# Patient Record
Sex: FEMALE | Race: WHITE | Employment: FULL TIME | URBAN - METROPOLITAN AREA
[De-identification: names, ages, dates, MRNs, and addresses within clinical notes are randomized per-mention and may not be internally consistent; named-entity substitution may affect disease eponyms.]

---

## 2017-02-08 ENCOUNTER — TELEPHONE (OUTPATIENT)
Dept: FAMILY MEDICINE CLINIC | Facility: CLINIC | Age: 37
End: 2017-02-08

## 2017-02-08 DIAGNOSIS — Z00.00 HEALTHCARE MAINTENANCE: Primary | ICD-10-CM

## 2017-02-08 RX ORDER — DOXYCYCLINE HYCLATE 100 MG
TABLET ORAL
COMMUNITY
Start: 2016-01-19 | End: 2017-03-03

## 2017-02-08 RX ORDER — CLINDAMYCIN PHOSPHATE 11.9 MG/ML
SOLUTION TOPICAL
COMMUNITY
Start: 2014-11-05 | End: 2018-10-22 | Stop reason: ALTCHOICE

## 2017-02-08 NOTE — TELEPHONE ENCOUNTER
Pt had labs done last back November 2014. Pt will come to office for lab draw.       Future Appointments  Date Time Provider Juanita Bronwyn   2/25/2017 9:30 AM REF [place] [facility]   3/3/2017 1:30 PM Sissy Martinez MD [facility]

## 2017-02-25 ENCOUNTER — LAB ENCOUNTER (OUTPATIENT)
Dept: LAB | Age: 37
End: 2017-02-25
Attending: FAMILY MEDICINE
Payer: COMMERCIAL

## 2017-02-25 DIAGNOSIS — Z00.00 HEALTHCARE MAINTENANCE: ICD-10-CM

## 2017-02-25 LAB
ALBUMIN SERPL-MCNC: 4 G/DL (ref 3.5–4.8)
ALP LIVER SERPL-CCNC: 53 U/L (ref 37–98)
ALT SERPL-CCNC: 23 U/L (ref 14–54)
AST SERPL-CCNC: 11 U/L (ref 15–41)
BASOPHILS # BLD AUTO: 0.05 X10(3) UL (ref 0–0.1)
BASOPHILS NFR BLD AUTO: 0.8 %
BILIRUB SERPL-MCNC: 0.5 MG/DL (ref 0.1–2)
BILIRUB UR QL STRIP.AUTO: NEGATIVE
BUN BLD-MCNC: 10 MG/DL (ref 8–20)
CALCIUM BLD-MCNC: 9 MG/DL (ref 8.3–10.3)
CHLORIDE: 105 MMOL/L (ref 101–111)
CHOLEST SMN-MCNC: 160 MG/DL (ref ?–200)
CLARITY UR REFRACT.AUTO: CLEAR
CO2: 26 MMOL/L (ref 22–32)
CREAT BLD-MCNC: 0.76 MG/DL (ref 0.55–1.02)
EOSINOPHIL # BLD AUTO: 0.15 X10(3) UL (ref 0–0.3)
EOSINOPHIL NFR BLD AUTO: 2.3 %
ERYTHROCYTE [DISTWIDTH] IN BLOOD BY AUTOMATED COUNT: 12.2 % (ref 11.5–16)
GLUCOSE BLD-MCNC: 76 MG/DL (ref 70–99)
GLUCOSE UR STRIP.AUTO-MCNC: NEGATIVE MG/DL
HCT VFR BLD AUTO: 43 % (ref 34–50)
HDLC SERPL-MCNC: 61 MG/DL (ref 45–?)
HDLC SERPL: 2.62 {RATIO} (ref ?–4.44)
HGB BLD-MCNC: 14.2 G/DL (ref 12–16)
IMMATURE GRANULOCYTE COUNT: 0.01 X10(3) UL (ref 0–1)
IMMATURE GRANULOCYTE RATIO %: 0.2 %
KETONES UR STRIP.AUTO-MCNC: NEGATIVE MG/DL
LDLC SERPL CALC-MCNC: 86 MG/DL (ref ?–130)
LEUKOCYTE ESTERASE UR QL STRIP.AUTO: NEGATIVE
LYMPHOCYTES # BLD AUTO: 2.56 X10(3) UL (ref 0.9–4)
LYMPHOCYTES NFR BLD AUTO: 38.5 %
M PROTEIN MFR SERPL ELPH: 7.6 G/DL (ref 6.1–8.3)
MCH RBC QN AUTO: 30.6 PG (ref 27–33.2)
MCHC RBC AUTO-ENTMCNC: 33 G/DL (ref 31–37)
MCV RBC AUTO: 92.7 FL (ref 81–100)
MONOCYTES # BLD AUTO: 0.96 X10(3) UL (ref 0.1–0.6)
MONOCYTES NFR BLD AUTO: 14.4 %
NEUTROPHIL ABS PRELIM: 2.92 X10 (3) UL (ref 1.3–6.7)
NEUTROPHILS # BLD AUTO: 2.92 X10(3) UL (ref 1.3–6.7)
NEUTROPHILS NFR BLD AUTO: 43.8 %
NITRITE UR QL STRIP.AUTO: NEGATIVE
NONHDLC SERPL-MCNC: 99 MG/DL (ref ?–130)
PH UR STRIP.AUTO: 7 [PH] (ref 4.5–8)
PLATELET # BLD AUTO: 261 10(3)UL (ref 150–450)
POTASSIUM SERPL-SCNC: 4.2 MMOL/L (ref 3.6–5.1)
PROT UR STRIP.AUTO-MCNC: NEGATIVE MG/DL
RBC # BLD AUTO: 4.64 X10(6)UL (ref 3.8–5.1)
RBC UR QL AUTO: NEGATIVE
RED CELL DISTRIBUTION WIDTH-SD: 41.5 FL (ref 35.1–46.3)
SODIUM SERPL-SCNC: 137 MMOL/L (ref 136–144)
SP GR UR STRIP.AUTO: 1.01 (ref 1–1.03)
TRIGLYCERIDES: 63 MG/DL (ref ?–150)
TSI SER-ACNC: 1.32 MIU/ML (ref 0.35–5.5)
UROBILINOGEN UR STRIP.AUTO-MCNC: <2 MG/DL
VLDL: 13 MG/DL (ref 5–40)
WBC # BLD AUTO: 6.7 X10(3) UL (ref 4–13)

## 2017-02-25 PROCEDURE — 80061 LIPID PANEL: CPT

## 2017-02-25 PROCEDURE — 85025 COMPLETE CBC W/AUTO DIFF WBC: CPT

## 2017-02-25 PROCEDURE — 81003 URINALYSIS AUTO W/O SCOPE: CPT

## 2017-02-25 PROCEDURE — 80053 COMPREHEN METABOLIC PANEL: CPT

## 2017-02-25 PROCEDURE — 84443 ASSAY THYROID STIM HORMONE: CPT

## 2017-02-27 ENCOUNTER — TELEPHONE (OUTPATIENT)
Dept: FAMILY MEDICINE CLINIC | Facility: CLINIC | Age: 37
End: 2017-02-27

## 2017-02-27 NOTE — TELEPHONE ENCOUNTER
----- Message from Jayesh Orellana MD sent at 2/27/2017 11:35 AM CST -----  Reviewed  Normal labs  Will d/w pt at upcoming appt  3/3/2017   1:30 PM    Ritu Prasad MD     [facility] [place]   [facility] [place]

## 2017-02-27 NOTE — TELEPHONE ENCOUNTER
Future Appointments  Date Time Provider Juanita Bronwyn   3/3/2017 1:30 PM Sunil Farfan MD [facility] [facility] [place]     Pt informed.

## 2017-03-03 ENCOUNTER — OFFICE VISIT (OUTPATIENT)
Dept: FAMILY MEDICINE CLINIC | Facility: CLINIC | Age: 37
End: 2017-03-03

## 2017-03-03 VITALS
HEIGHT: 63.75 IN | HEART RATE: 88 BPM | DIASTOLIC BLOOD PRESSURE: 90 MMHG | BODY MASS INDEX: 22.44 KG/M2 | SYSTOLIC BLOOD PRESSURE: 132 MMHG | TEMPERATURE: 99 F | WEIGHT: 129.81 LBS | RESPIRATION RATE: 20 BRPM

## 2017-03-03 DIAGNOSIS — Z00.00 ROUTINE GENERAL MEDICAL EXAMINATION AT A HEALTH CARE FACILITY: Primary | ICD-10-CM

## 2017-03-03 PROCEDURE — 99395 PREV VISIT EST AGE 18-39: CPT | Performed by: FAMILY MEDICINE

## 2017-03-03 RX ORDER — ONDANSETRON 4 MG/1
4 TABLET, ORALLY DISINTEGRATING ORAL AS NEEDED
Refills: 0 | COMMUNITY
Start: 2017-01-21 | End: 2019-08-15 | Stop reason: ALTCHOICE

## 2017-03-03 RX ORDER — DOXYCYCLINE 40 MG/1
40 CAPSULE ORAL EVERY MORNING
COMMUNITY
End: 2018-10-22 | Stop reason: ALTCHOICE

## 2017-03-03 NOTE — PROGRESS NOTES
CC: Annual Physical Exam    HPI:   Jameson Gowers is a 29year old female who presents for a complete physical exam.  Patient generally feeling well.labs reviewed, normal and reassuring.   .     Wt Readings from Last 6 Encounters:  03/03/17 : 129 lb 13 oz 12.2 11.5-16.0 %   RDW-SD 41.5 35.1-46.3 fL   Neutrophil Absolute Prelim 2.92 1.30-6.70 x10 (3) uL   Neutrophil Absolute 2.92 1.30-6.70 x10(3) uL   Lymphocyte Absolute 2.56 0.90-4.00 x10(3) uL   Monocyte Absolute 0.96 (H) 0.10-0.60 x10(3) uL   Eosinophil A Exercise: running, walking. Diet: tries to follow healthy diet     REVIEW OF SYSTEMS:   CONSTITUTIONAL:  Denies unusual weight gain/loss, fever, chills, or fatigue.   EENT:  Eyes:  Denies eye pain, visual loss, blurred vision, double vision intact bilaterally, no excess cerumen or erythema. Nose: patent, no nasal discharge Throat:  No tonsillar erythema or exudate. Mouth:  No oral lesions or ulcerations, good dentition. NECK: Supple, no JVD, no carotid bruit, no thyromegaly.   SKIN: No rashe patient is asked to return in 1 yr.

## 2017-03-31 ENCOUNTER — OFFICE VISIT (OUTPATIENT)
Dept: FAMILY MEDICINE CLINIC | Facility: CLINIC | Age: 37
End: 2017-03-31

## 2017-03-31 VITALS
SYSTOLIC BLOOD PRESSURE: 114 MMHG | BODY MASS INDEX: 24 KG/M2 | HEART RATE: 100 BPM | WEIGHT: 136.31 LBS | TEMPERATURE: 99 F | RESPIRATION RATE: 20 BRPM | DIASTOLIC BLOOD PRESSURE: 76 MMHG

## 2017-03-31 DIAGNOSIS — R21 RASH AND NONSPECIFIC SKIN ERUPTION: Primary | ICD-10-CM

## 2017-03-31 PROCEDURE — 99212 OFFICE O/P EST SF 10 MIN: CPT | Performed by: FAMILY MEDICINE

## 2017-03-31 RX ORDER — VALACYCLOVIR HYDROCHLORIDE 1 G/1
1 TABLET, FILM COATED ORAL 3 TIMES DAILY
COMMUNITY
End: 2018-10-22 | Stop reason: ALTCHOICE

## 2017-03-31 NOTE — PATIENT INSTRUCTIONS
Likely shingles rash starting up. Finish the course of valtrex. Return to clinic if pain or rash worse. Will consider evaluation for low immune system if another episode.

## 2017-04-01 NOTE — PROGRESS NOTES
[facility] [place]  PROGRESS NOTE  Chief Complaint:   Patient presents with:  Rash: possible singles on side       HPI:   This is a 29year old female presents to discuss recent rash.   But 2 days ago patient started having one isolated skin lesi 7.0 4.5-8.0   Protein Urine Negative Negative mg/dl   Urobilinogen Urine <2.0 0.2-2.0 mg/dL   Nitrite Urine Negative Negative   Leukocyte Esterase Urine Negative Negative   Microscopic Microscopic not indicated    -CBC W/ DIFFERENTIAL   Result Value Ref Ra Release Take 40 mg by mouth every morning. Disp:  Rfl:    Levonorgestrel (MIRENA, 52 MG,) 20 MCG/24HR Intrauterine IUD  Disp:  Rfl:    ondansetron 4 MG Oral Tablet Dispersible 4 mg as needed.  Disp:  Rfl: 0   Clindamycin Phosphate (CLEOCIN-T) 1 % External S 09/01/2016    Patient/Caregiver Education: Patient/Caregiver Education: There are no barriers to learning. Medical education done. Outcome: Patient verbalizes understanding.  Patient is notified to call with any questions, complications, allergies, or wor

## 2018-08-27 ENCOUNTER — TELEPHONE (OUTPATIENT)
Dept: FAMILY MEDICINE CLINIC | Facility: CLINIC | Age: 38
End: 2018-08-27

## 2018-08-27 NOTE — TELEPHONE ENCOUNTER
Pt dropped off form for CR review - marked urgent. TB symptom eval form for [address] [facility] [address]. Pt last seen 3/2017. Pt informed that CR is not able to complete without appt. Pt verbalized understanding.   Pt stated that she would

## 2018-10-22 ENCOUNTER — OFFICE VISIT (OUTPATIENT)
Dept: FAMILY MEDICINE CLINIC | Facility: CLINIC | Age: 38
End: 2018-10-22
Payer: COMMERCIAL

## 2018-10-22 ENCOUNTER — APPOINTMENT (OUTPATIENT)
Dept: LAB | Age: 38
End: 2018-10-22
Attending: FAMILY MEDICINE
Payer: COMMERCIAL

## 2018-10-22 VITALS
WEIGHT: 137.38 LBS | TEMPERATURE: 98 F | OXYGEN SATURATION: 98 % | HEART RATE: 102 BPM | RESPIRATION RATE: 16 BRPM | SYSTOLIC BLOOD PRESSURE: 140 MMHG | BODY MASS INDEX: 23.74 KG/M2 | DIASTOLIC BLOOD PRESSURE: 82 MMHG | HEIGHT: 63.75 IN

## 2018-10-22 DIAGNOSIS — I34.1 MVP (MITRAL VALVE PROLAPSE): ICD-10-CM

## 2018-10-22 DIAGNOSIS — R03.0 WHITE COAT SYNDROME WITHOUT DIAGNOSIS OF HYPERTENSION: ICD-10-CM

## 2018-10-22 DIAGNOSIS — Z00.00 ROUTINE GENERAL MEDICAL EXAMINATION AT A HEALTH CARE FACILITY: Primary | ICD-10-CM

## 2018-10-22 PROCEDURE — 81003 URINALYSIS AUTO W/O SCOPE: CPT | Performed by: FAMILY MEDICINE

## 2018-10-22 PROCEDURE — 36415 COLL VENOUS BLD VENIPUNCTURE: CPT | Performed by: FAMILY MEDICINE

## 2018-10-22 PROCEDURE — 80050 GENERAL HEALTH PANEL: CPT | Performed by: FAMILY MEDICINE

## 2018-10-22 PROCEDURE — 99395 PREV VISIT EST AGE 18-39: CPT | Performed by: FAMILY MEDICINE

## 2018-10-22 PROCEDURE — 80061 LIPID PANEL: CPT | Performed by: FAMILY MEDICINE

## 2018-10-22 NOTE — PROGRESS NOTES
[facility] [place]  PROGRESS NOTE  Chief Complaint:   Patient presents with:  Physical      HPI:   This is a 40year old female coming in for annual health check'  Pt sees Arnaldo Hagan 75 earlier today  Monitor Home BP  - 120/80's at home, 106/70 Urobilinogen Urine <2.0 0.2 - 2.0 mg/dL    Nitrite Urine Negative Negative    Leukocyte Esterase Urine Negative Negative    Microscopic Microscopic not indicated    CBC W/ DIFFERENTIAL   Result Value Ref Range    WBC 6.7 4.0 - 13.0 x10(3) uL    RBC 4.64 3. [facility]    Tobacco Use      Smoking status: Never Smoker      Smokeless tobacco: Never Used    Substance and Sexual Activity      Alcohol use:  Yes        Alcohol/week: 0.0 oz        Comment: 2 per month- wine,  mixed      Drug use: No      Sexual Jose Juan Gunner Denies excessive sweating, cold or heat intolerance, polyuria or polydipsia. ALLERGIES:  Denies allergic response, history of asthma, sneezing, hives, eczema or rhinitis.      EXAM:   /82 (BP Location: Left arm, Patient Position: Sitting, Cuff Size: adjustments. - CBC WITH DIFFERENTIAL WITH PLATELET; Future  - COMP METABOLIC PANEL (14); Future  - LIPID PANEL;  Future  - TSH W REFLEX TO FREE T4; Future  - URINALYSIS WITH CULTURE REFLEX; Future  - URINALYSIS WITH CULTURE REFLEX  - CBC WITH DIFFERENTIAL

## 2018-10-22 NOTE — PATIENT INSTRUCTIONS
Fasting labs today    Pt to monitor diet, encourage exercise    Recheck as needed    Encourage monitor BP and pulse intermittently-  D/w pt if elevated more often, consider toprol 25 mg a day.

## 2018-10-23 ENCOUNTER — PATIENT MESSAGE (OUTPATIENT)
Dept: FAMILY MEDICINE CLINIC | Facility: CLINIC | Age: 38
End: 2018-10-23

## 2018-10-23 NOTE — TELEPHONE ENCOUNTER
From: Taylor Cho  To: Eleanor Feldman MD  Sent: 10/23/2018 12:52 PM CDT  Subject: Visit Follow-up Question    Hi there. BP today is 116/74. HR 78.  I bought a used treadmill off [employer] and ate quinoa for dinner last night :-) Thanks for the edelmira

## 2019-03-25 ENCOUNTER — OFFICE VISIT (OUTPATIENT)
Dept: FAMILY MEDICINE CLINIC | Facility: CLINIC | Age: 39
End: 2019-03-25
Payer: COMMERCIAL

## 2019-03-25 VITALS
DIASTOLIC BLOOD PRESSURE: 90 MMHG | OXYGEN SATURATION: 100 % | WEIGHT: 134 LBS | HEIGHT: 63.75 IN | HEART RATE: 100 BPM | SYSTOLIC BLOOD PRESSURE: 120 MMHG | TEMPERATURE: 97 F | BODY MASS INDEX: 23.16 KG/M2

## 2019-03-25 DIAGNOSIS — J18.9 PNEUMONIA OF RIGHT LOWER LOBE DUE TO INFECTIOUS ORGANISM: Primary | ICD-10-CM

## 2019-03-25 PROCEDURE — 99213 OFFICE O/P EST LOW 20 MIN: CPT | Performed by: NURSE PRACTITIONER

## 2019-03-25 RX ORDER — AZITHROMYCIN 250 MG/1
250 TABLET, FILM COATED ORAL DAILY
COMMUNITY
End: 2019-08-15 | Stop reason: ALTCHOICE

## 2019-03-25 NOTE — PROGRESS NOTES
CHIEF COMPLAINT:   Patient presents with:  Er F/u: pneumonia      HPI:   Igor Juarez is a 41year old female who presents to clinic today with complaints of pneumonia-   Had negative viral nasal swab.    Symptoms hit her fast- fever, body aches- was in b congestion  LYMPH: no lymphadenopathy. THROAT: oral mucosa pink, moist. Posterior pharynx not erythematous or injected. No exudates.   NECK: supple, non-tender  THYROID: Normal size, no nodules  LUNGS: Lungs are clear to auscultation bilaterally, no whee

## 2019-03-29 ENCOUNTER — TELEPHONE (OUTPATIENT)
Dept: FAMILY MEDICINE CLINIC | Facility: CLINIC | Age: 39
End: 2019-03-29

## 2019-03-29 RX ORDER — AZITHROMYCIN 250 MG/1
TABLET, FILM COATED ORAL
Qty: 6 TABLET | Refills: 0 | Status: SHIPPED | OUTPATIENT
Start: 2019-03-29 | End: 2019-08-15 | Stop reason: ALTCHOICE

## 2019-03-29 NOTE — TELEPHONE ENCOUNTER
Pt prefers to continue with antx for another 5 days at this point. Pt states if s/s worsen or if not better after additional 5 days, then pt will call to schedule follow up appt.   Uses Corin Lucas

## 2019-03-29 NOTE — TELEPHONE ENCOUNTER
Pt states she went to ER on 3/22, pt was dx:  with right lower lobe pneumonia, pt states she was very ill. Pt states she was given Rocephin and IV fluids, pt was sent home on regular strength Z-pack. Pt was seen in office per EL on 3/25.  Pt states she w

## 2019-09-26 ENCOUNTER — TELEPHONE (OUTPATIENT)
Dept: FAMILY MEDICINE CLINIC | Facility: CLINIC | Age: 39
End: 2019-09-26

## 2019-09-26 DIAGNOSIS — Z11.1 SCREENING FOR TUBERCULOSIS: Primary | ICD-10-CM

## 2019-09-26 NOTE — TELEPHONE ENCOUNTER
Future Appointments   Date Time Provider Juanita Barnhart   9/27/2019 11:15 AM REF [place] [facility]

## 2019-09-26 NOTE — TELEPHONE ENCOUNTER
needs TB test  / quantaferine BW  for her job     can you put an order in [facility] ?   ( is aware Dr Giancarlo Lanza out until next week )    please advise

## 2019-09-27 ENCOUNTER — APPOINTMENT (OUTPATIENT)
Dept: LAB | Age: 39
End: 2019-09-27
Attending: FAMILY MEDICINE
Payer: COMMERCIAL

## 2019-09-27 DIAGNOSIS — Z11.1 SCREENING FOR TUBERCULOSIS: ICD-10-CM

## 2019-09-27 PROCEDURE — 86480 TB TEST CELL IMMUN MEASURE: CPT | Performed by: NURSE PRACTITIONER

## 2019-09-27 PROCEDURE — 36415 COLL VENOUS BLD VENIPUNCTURE: CPT | Performed by: NURSE PRACTITIONER

## 2019-10-02 ENCOUNTER — TELEPHONE (OUTPATIENT)
Dept: FAMILY MEDICINE CLINIC | Facility: CLINIC | Age: 39
End: 2019-10-02

## 2019-10-02 NOTE — TELEPHONE ENCOUNTER
Informed pt of her blood test results. Copy placed up front for . Pt expressed understanding and thanks.

## 2019-10-02 NOTE — TELEPHONE ENCOUNTER
----- Message from LIZANDRO Nelson sent at 10/2/2019  1:35 PM CDT -----  Please notify patient that her QuantiFERON gold is negative–please give patient a copy of the lab if she is unable to print from my chart.

## 2020-07-28 ENCOUNTER — OFFICE VISIT (OUTPATIENT)
Dept: FAMILY MEDICINE CLINIC | Facility: CLINIC | Age: 40
End: 2020-07-28
Payer: COMMERCIAL

## 2020-07-28 VITALS
HEART RATE: 104 BPM | OXYGEN SATURATION: 98 % | RESPIRATION RATE: 16 BRPM | HEIGHT: 63.75 IN | BODY MASS INDEX: 24.02 KG/M2 | TEMPERATURE: 98 F | DIASTOLIC BLOOD PRESSURE: 62 MMHG | WEIGHT: 139 LBS | SYSTOLIC BLOOD PRESSURE: 110 MMHG

## 2020-07-28 DIAGNOSIS — M79.10 MYALGIA: ICD-10-CM

## 2020-07-28 DIAGNOSIS — R21 RASH: ICD-10-CM

## 2020-07-28 DIAGNOSIS — B02.9 HERPES ZOSTER WITHOUT COMPLICATION: ICD-10-CM

## 2020-07-28 DIAGNOSIS — Z00.00 ROUTINE GENERAL MEDICAL EXAMINATION AT A HEALTH CARE FACILITY: Primary | ICD-10-CM

## 2020-07-28 PROBLEM — J18.9 PNEUMONIA: Status: ACTIVE | Noted: 2019-03-22

## 2020-07-28 PROBLEM — J18.9 PNEUMONIA: Status: RESOLVED | Noted: 2019-03-22 | Resolved: 2020-07-28

## 2020-07-28 PROCEDURE — 3074F SYST BP LT 130 MM HG: CPT | Performed by: FAMILY MEDICINE

## 2020-07-28 PROCEDURE — 3008F BODY MASS INDEX DOCD: CPT | Performed by: FAMILY MEDICINE

## 2020-07-28 PROCEDURE — 3078F DIAST BP <80 MM HG: CPT | Performed by: FAMILY MEDICINE

## 2020-07-28 PROCEDURE — 99395 PREV VISIT EST AGE 18-39: CPT | Performed by: FAMILY MEDICINE

## 2020-07-28 NOTE — PATIENT INSTRUCTIONS
Labs ordered  Monitor rash and muscle aches    Continue exercise      Further recommendations pending lab results

## 2020-07-28 NOTE — PROGRESS NOTES
[address]  PROGRESS NOTE  Chief Complaint:   Patient presents with: Well Adult      HPI:   This is a 46year old female coming in for general check  Due for labs    occ derm issues.  -- skin biopsy- inflammatory skin lesions- nor respon drinks        Comment: 2 per month- wine,  mixed      Drug use: No    Other Topics      Concerns:    Family History:  Family History   Problem Relation Age of Onset   • Hypertension Mother    • Cancer Mother         CLL   • Other (elevated chol) Mother Estimated body mass index is 24.05 kg/m² as calculated from the following:    Height as of this encounter: 63.75\". Weight as of this encounter: 139 lb (63 kg). Vital signs reviewed. Appears stated age, well groomed.   Physical Exam:  GEN:  Patient is a Future    4. Rash  Monitor for recurrent rashes rule out herpes.   - HERPES SIMPLEX VIRUS I/II, IGG; Future      Problem List:  Patient Active Problem List:     Routine general medical examination at a health care facility     MVP (mitral valve prolapse)

## 2020-08-10 ENCOUNTER — LABORATORY ENCOUNTER (OUTPATIENT)
Dept: LAB | Age: 40
End: 2020-08-10
Attending: FAMILY MEDICINE
Payer: COMMERCIAL

## 2020-08-10 DIAGNOSIS — B02.9 HERPES ZOSTER WITHOUT COMPLICATION: ICD-10-CM

## 2020-08-10 DIAGNOSIS — M79.10 MYALGIA: ICD-10-CM

## 2020-08-10 DIAGNOSIS — Z00.00 ROUTINE GENERAL MEDICAL EXAMINATION AT A HEALTH CARE FACILITY: ICD-10-CM

## 2020-08-10 DIAGNOSIS — R21 RASH: Primary | ICD-10-CM

## 2020-08-10 LAB
ALBUMIN SERPL-MCNC: 4.1 G/DL (ref 3.4–5)
ALBUMIN/GLOB SERPL: 1.1 {RATIO} (ref 1–2)
ALP LIVER SERPL-CCNC: 51 U/L (ref 37–98)
ALT SERPL-CCNC: 16 U/L (ref 13–56)
ANION GAP SERPL CALC-SCNC: 2 MMOL/L (ref 0–18)
AST SERPL-CCNC: 14 U/L (ref 15–37)
BASOPHILS # BLD AUTO: 0.07 X10(3) UL (ref 0–0.2)
BASOPHILS NFR BLD AUTO: 0.9 %
BILIRUB SERPL-MCNC: 0.6 MG/DL (ref 0.1–2)
BILIRUB UR QL STRIP.AUTO: NEGATIVE
BUN BLD-MCNC: 13 MG/DL (ref 7–18)
BUN/CREAT SERPL: 16.3 (ref 10–20)
CALCIUM BLD-MCNC: 9 MG/DL (ref 8.5–10.1)
CHLORIDE SERPL-SCNC: 108 MMOL/L (ref 98–112)
CHOLEST SMN-MCNC: 181 MG/DL (ref ?–200)
CLARITY UR REFRACT.AUTO: CLEAR
CO2 SERPL-SCNC: 28 MMOL/L (ref 21–32)
COLOR UR AUTO: YELLOW
CREAT BLD-MCNC: 0.8 MG/DL (ref 0.55–1.02)
CRP SERPL-MCNC: <0.29 MG/DL (ref ?–0.3)
DEPRECATED RDW RBC AUTO: 43.1 FL (ref 35.1–46.3)
EOSINOPHIL # BLD AUTO: 0.11 X10(3) UL (ref 0–0.7)
EOSINOPHIL NFR BLD AUTO: 1.5 %
ERYTHROCYTE [DISTWIDTH] IN BLOOD BY AUTOMATED COUNT: 12.4 % (ref 11–15)
GLOBULIN PLAS-MCNC: 3.6 G/DL (ref 2.8–4.4)
GLUCOSE BLD-MCNC: 88 MG/DL (ref 70–99)
GLUCOSE UR STRIP.AUTO-MCNC: NEGATIVE MG/DL
HCT VFR BLD AUTO: 45 % (ref 35–48)
HDLC SERPL-MCNC: 55 MG/DL (ref 40–59)
HGB BLD-MCNC: 14.3 G/DL (ref 12–16)
IMM GRANULOCYTES # BLD AUTO: 0.02 X10(3) UL (ref 0–1)
IMM GRANULOCYTES NFR BLD: 0.3 %
KETONES UR STRIP.AUTO-MCNC: NEGATIVE MG/DL
LDLC SERPL CALC-MCNC: 112 MG/DL (ref ?–100)
LEUKOCYTE ESTERASE UR QL STRIP.AUTO: NEGATIVE
LYMPHOCYTES # BLD AUTO: 2.75 X10(3) UL (ref 1–4)
LYMPHOCYTES NFR BLD AUTO: 36.6 %
M PROTEIN MFR SERPL ELPH: 7.7 G/DL (ref 6.4–8.2)
MCH RBC QN AUTO: 30.3 PG (ref 26–34)
MCHC RBC AUTO-ENTMCNC: 31.8 G/DL (ref 31–37)
MCV RBC AUTO: 95.3 FL (ref 80–100)
MONOCYTES # BLD AUTO: 0.61 X10(3) UL (ref 0.1–1)
MONOCYTES NFR BLD AUTO: 8.1 %
NEUTROPHILS # BLD AUTO: 3.95 X10 (3) UL (ref 1.5–7.7)
NEUTROPHILS # BLD AUTO: 3.95 X10(3) UL (ref 1.5–7.7)
NEUTROPHILS NFR BLD AUTO: 52.6 %
NITRITE UR QL STRIP.AUTO: NEGATIVE
NONHDLC SERPL-MCNC: 126 MG/DL (ref ?–130)
OSMOLALITY SERPL CALC.SUM OF ELEC: 286 MOSM/KG (ref 275–295)
PATIENT FASTING Y/N/NP: YES
PATIENT FASTING Y/N/NP: YES
PH UR STRIP.AUTO: 6 [PH] (ref 4.5–8)
PLATELET # BLD AUTO: 282 10(3)UL (ref 150–450)
POTASSIUM SERPL-SCNC: 4.2 MMOL/L (ref 3.5–5.1)
PROT UR STRIP.AUTO-MCNC: NEGATIVE MG/DL
RBC # BLD AUTO: 4.72 X10(6)UL (ref 3.8–5.3)
RBC UR QL AUTO: NEGATIVE
RHEUMATOID FACT SERPL-ACNC: <10 IU/ML (ref ?–15)
SED RATE-ML: 6 MM/HR (ref 0–25)
SODIUM SERPL-SCNC: 138 MMOL/L (ref 136–145)
SP GR UR STRIP.AUTO: 1.02 (ref 1–1.03)
TRIGL SERPL-MCNC: 71 MG/DL (ref 30–149)
TSI SER-ACNC: 1.31 MIU/ML (ref 0.36–3.74)
UROBILINOGEN UR STRIP.AUTO-MCNC: <2 MG/DL
VLDLC SERPL CALC-MCNC: 14 MG/DL (ref 0–30)
WBC # BLD AUTO: 7.5 X10(3) UL (ref 4–11)

## 2020-08-10 PROCEDURE — 85652 RBC SED RATE AUTOMATED: CPT | Performed by: FAMILY MEDICINE

## 2020-08-10 PROCEDURE — 86038 ANTINUCLEAR ANTIBODIES: CPT | Performed by: FAMILY MEDICINE

## 2020-08-10 PROCEDURE — 36415 COLL VENOUS BLD VENIPUNCTURE: CPT | Performed by: FAMILY MEDICINE

## 2020-08-10 PROCEDURE — 86695 HERPES SIMPLEX TYPE 1 TEST: CPT | Performed by: FAMILY MEDICINE

## 2020-08-10 PROCEDURE — 87476 LYME DIS DNA AMP PROBE: CPT | Performed by: FAMILY MEDICINE

## 2020-08-10 PROCEDURE — 86696 HERPES SIMPLEX TYPE 2 TEST: CPT | Performed by: FAMILY MEDICINE

## 2020-08-10 PROCEDURE — 80050 GENERAL HEALTH PANEL: CPT | Performed by: FAMILY MEDICINE

## 2020-08-10 PROCEDURE — 80061 LIPID PANEL: CPT | Performed by: FAMILY MEDICINE

## 2020-08-10 PROCEDURE — 86431 RHEUMATOID FACTOR QUANT: CPT | Performed by: FAMILY MEDICINE

## 2020-08-10 PROCEDURE — 81003 URINALYSIS AUTO W/O SCOPE: CPT | Performed by: FAMILY MEDICINE

## 2020-08-10 PROCEDURE — 86140 C-REACTIVE PROTEIN: CPT | Performed by: FAMILY MEDICINE

## 2020-08-12 LAB
ANA SCREEN: NEGATIVE
HSV 1 GLYCOPROTEIN G, IGG: POSITIVE
HSV 2 GLYCOPROTEIN G, IGG: NEGATIVE

## 2020-08-14 LAB — BORRELIA SPECIES DNA DETECTION: NOT DETECTED

## 2020-08-19 ENCOUNTER — PATIENT MESSAGE (OUTPATIENT)
Dept: FAMILY MEDICINE CLINIC | Facility: CLINIC | Age: 40
End: 2020-08-19

## 2020-08-19 RX ORDER — FAMCICLOVIR 500 MG/1
500 TABLET, FILM COATED ORAL 3 TIMES DAILY
Qty: 21 TABLET | Refills: 1 | Status: SHIPPED | OUTPATIENT
Start: 2020-08-19 | End: 2020-08-26

## 2020-08-19 NOTE — TELEPHONE ENCOUNTER
From: Mk Blackwell  To: Katherin Barton MD  Sent: 8/19/2020 7:42 AM CDT  Subject: Test Results Question    Dr. Eliot Cruz,    Thanks so much for your lab result message and for taking the time to listen during my visit.  That was such a good idea to test fo

## 2020-10-29 ENCOUNTER — PATIENT MESSAGE (OUTPATIENT)
Dept: FAMILY MEDICINE CLINIC | Facility: CLINIC | Age: 40
End: 2020-10-29

## 2020-10-30 ENCOUNTER — PATIENT MESSAGE (OUTPATIENT)
Dept: FAMILY MEDICINE CLINIC | Facility: CLINIC | Age: 40
End: 2020-10-30

## 2020-10-30 NOTE — TELEPHONE ENCOUNTER
From: Kaitlyn King  To: Ricky Colindres MD  Sent: 10/29/2020 12:34 PM CDT  Subject: Non-Urgent Medical Question    Dr. Page Dangelo you are surviving the \"second wave\" and staying healthy.  I would like to make sure we all have enough Vitamin D thi

## 2020-10-30 NOTE — TELEPHONE ENCOUNTER
From: James Basilio  To: Jenny Gil MD  Sent: 10/30/2020 9:57 AM CDT  Subject: Other    Thanks so much for the advice. Shilo says thank you!      Flu Shot dates were:  Marissa Carlos - 10/8  Roman Jacksonack: 10/15

## 2020-10-30 NOTE — TELEPHONE ENCOUNTER
Good morning Kemar Cooney. Thanks for the information regarding you and your family. I do hope you are all staying healthy. At this point in time your vitamin D supplements seem appropriate.   If anyone in the household develops respiratory type symptoms then in

## 2021-07-10 LAB — AMB EXT COVID-19 RESULT: DETECTED

## 2021-07-12 ENCOUNTER — PATIENT MESSAGE (OUTPATIENT)
Dept: FAMILY MEDICINE CLINIC | Facility: CLINIC | Age: 41
End: 2021-07-12

## 2021-07-12 NOTE — TELEPHONE ENCOUNTER
From: Regino Carlson  To: Dorene Way MD  Sent: 7/12/2021 2:57 PM CDT  Subject: Non-Urgent Medical Question    Hi Dr. Trevon Badillo,    Unfortunately I am one of the unlucky ones and I tested positive for Covid on Saturday despite being vaccinated.  I have h

## 2021-07-13 PROBLEM — U07.1 COVID-19: Status: ACTIVE | Noted: 2021-07-09

## 2021-07-13 NOTE — TELEPHONE ENCOUNTER
Patient contacted regarding her positive Covid state. Discussed with her symptoms which started on the evening of July 9. She has been using Flonase Zyrtec Advil Tylenol and feels that some of her sinus pressure from the end yesterday has improved.   Ruth Ann

## 2021-07-17 ENCOUNTER — TELEPHONE (OUTPATIENT)
Dept: FAMILY MEDICINE CLINIC | Facility: CLINIC | Age: 41
End: 2021-07-17

## 2021-07-17 NOTE — TELEPHONE ENCOUNTER
Mother dropped off short term disability form for CR to complete due to Covid- pt does not have PTO at work. Form completed per CR. Form faxed to the [employer]. Left message for pt. Can mail copy to pt as well. - awaiting call paulino

## 2021-07-19 NOTE — TELEPHONE ENCOUNTER
Pt contacted. Pt requested copy of paperwork for . Pt states her  Scott Simpson will . Pt states her last day of quarantine is tomorrow.

## 2021-08-02 ENCOUNTER — OFFICE VISIT (OUTPATIENT)
Dept: FAMILY MEDICINE CLINIC | Facility: CLINIC | Age: 41
End: 2021-08-02
Payer: COMMERCIAL

## 2021-08-02 VITALS
BODY MASS INDEX: 23.05 KG/M2 | TEMPERATURE: 98 F | WEIGHT: 133.38 LBS | HEART RATE: 90 BPM | SYSTOLIC BLOOD PRESSURE: 114 MMHG | DIASTOLIC BLOOD PRESSURE: 76 MMHG | HEIGHT: 63.75 IN | OXYGEN SATURATION: 99 % | RESPIRATION RATE: 16 BRPM

## 2021-08-02 DIAGNOSIS — M25.552 ACUTE HIP PAIN, LEFT: ICD-10-CM

## 2021-08-02 DIAGNOSIS — I34.1 MVP (MITRAL VALVE PROLAPSE): ICD-10-CM

## 2021-08-02 DIAGNOSIS — U07.1 COVID-19: ICD-10-CM

## 2021-08-02 DIAGNOSIS — Z00.00 ANNUAL PHYSICAL EXAM: Primary | ICD-10-CM

## 2021-08-02 DIAGNOSIS — R03.0 WHITE COAT SYNDROME WITHOUT DIAGNOSIS OF HYPERTENSION: ICD-10-CM

## 2021-08-02 PROBLEM — R21 RASH: Status: RESOLVED | Noted: 2020-07-28 | Resolved: 2021-08-02

## 2021-08-02 PROBLEM — B02.9 HERPES ZOSTER WITHOUT COMPLICATION: Status: RESOLVED | Noted: 2020-07-28 | Resolved: 2021-08-02

## 2021-08-02 PROBLEM — M79.10 MYALGIA: Status: RESOLVED | Noted: 2020-07-28 | Resolved: 2021-08-02

## 2021-08-02 PROCEDURE — 3074F SYST BP LT 130 MM HG: CPT | Performed by: FAMILY MEDICINE

## 2021-08-02 PROCEDURE — 3078F DIAST BP <80 MM HG: CPT | Performed by: FAMILY MEDICINE

## 2021-08-02 PROCEDURE — 3008F BODY MASS INDEX DOCD: CPT | Performed by: FAMILY MEDICINE

## 2021-08-02 PROCEDURE — 99395 PREV VISIT EST AGE 18-39: CPT | Performed by: FAMILY MEDICINE

## 2021-08-02 NOTE — PROGRESS NOTES
CC: Annual Physical Exam    HPI:   Micah Bonner is a 45year old female who presents for a complete physical exam.  Patient sees gyne - normal pap 2020    Pt feels back to normal post covid- fatigue better, smell and taste better, has no residual cough sean Spouse name: Veronica Condon      Number of children: 2      Years of education: Not on file      Highest education level: Master's degree (e.g., MA, MS, Jamarcus, MEd, MSW, REBECCA)    Occupational History      Occupation: NP        Comment: [employer] ED    Tobacco Use 90   Temp 98.1 °F (36.7 °C) (Temporal)   Resp 16   Ht 5' 3.75\" (1.619 m)   Wt 133 lb 6.4 oz (60.5 kg)   SpO2 99%   BMI 23.08 kg/m²  Estimated body mass index is 23.08 kg/m² as calculated from the following:    Height as of this encounter: 5' 3.75\" (1.619 will monitor. 3. Acute hip pain, left  Improved, exam benign today encourage continued monitoring recheck if issues    4. MVP (mitral valve prolapse)  Presently asymptomatic    5.  White coat syndrome without diagnosis of hypertension  Improved with norm

## 2021-08-02 NOTE — PATIENT INSTRUCTIONS
continue exercise program, stretching for hip, return to PT if needed    Recommend pneumonia vaccine-anytime in the future    Screening mammogram at age 37    Recheck labs and medical followup yearly

## 2022-11-14 ENCOUNTER — OFFICE VISIT (OUTPATIENT)
Dept: FAMILY MEDICINE CLINIC | Facility: CLINIC | Age: 42
End: 2022-11-14
Payer: COMMERCIAL

## 2022-11-14 VITALS
HEIGHT: 63.75 IN | WEIGHT: 142.38 LBS | OXYGEN SATURATION: 100 % | SYSTOLIC BLOOD PRESSURE: 110 MMHG | TEMPERATURE: 97 F | DIASTOLIC BLOOD PRESSURE: 62 MMHG | HEART RATE: 70 BPM | RESPIRATION RATE: 16 BRPM | BODY MASS INDEX: 24.61 KG/M2

## 2022-11-14 DIAGNOSIS — Z00.00 ANNUAL PHYSICAL EXAM: Primary | ICD-10-CM

## 2022-11-14 DIAGNOSIS — I34.1 MVP (MITRAL VALVE PROLAPSE): ICD-10-CM

## 2022-11-14 PROCEDURE — 3078F DIAST BP <80 MM HG: CPT | Performed by: FAMILY MEDICINE

## 2022-11-14 PROCEDURE — 99396 PREV VISIT EST AGE 40-64: CPT | Performed by: FAMILY MEDICINE

## 2022-11-14 PROCEDURE — 3008F BODY MASS INDEX DOCD: CPT | Performed by: FAMILY MEDICINE

## 2022-11-14 PROCEDURE — 3074F SYST BP LT 130 MM HG: CPT | Performed by: FAMILY MEDICINE

## 2022-11-22 ENCOUNTER — LABORATORY ENCOUNTER (OUTPATIENT)
Dept: LAB | Age: 42
End: 2022-11-22
Attending: FAMILY MEDICINE
Payer: COMMERCIAL

## 2022-11-22 DIAGNOSIS — Z00.00 ANNUAL PHYSICAL EXAM: ICD-10-CM

## 2022-11-22 LAB
ALBUMIN SERPL-MCNC: 4.2 G/DL (ref 3.4–5)
ALBUMIN/GLOB SERPL: 1.3 {RATIO} (ref 1–2)
ALP LIVER SERPL-CCNC: 53 U/L
ALT SERPL-CCNC: 20 U/L
ANION GAP SERPL CALC-SCNC: 6 MMOL/L (ref 0–18)
AST SERPL-CCNC: 17 U/L (ref 15–37)
BASOPHILS # BLD AUTO: 0.06 X10(3) UL (ref 0–0.2)
BASOPHILS NFR BLD AUTO: 0.9 %
BILIRUB SERPL-MCNC: 0.7 MG/DL (ref 0.1–2)
BILIRUB UR QL STRIP.AUTO: NEGATIVE
BUN BLD-MCNC: 10 MG/DL (ref 7–18)
CALCIUM BLD-MCNC: 9.4 MG/DL (ref 8.5–10.1)
CHLORIDE SERPL-SCNC: 106 MMOL/L (ref 98–112)
CHOLEST SERPL-MCNC: 185 MG/DL (ref ?–200)
CLARITY UR REFRACT.AUTO: CLEAR
CO2 SERPL-SCNC: 26 MMOL/L (ref 21–32)
COLOR UR AUTO: YELLOW
CREAT BLD-MCNC: 0.73 MG/DL
EOSINOPHIL # BLD AUTO: 0.11 X10(3) UL (ref 0–0.7)
EOSINOPHIL NFR BLD AUTO: 1.6 %
ERYTHROCYTE [DISTWIDTH] IN BLOOD BY AUTOMATED COUNT: 11.9 %
FASTING PATIENT LIPID ANSWER: YES
FASTING STATUS PATIENT QL REPORTED: YES
GFR SERPLBLD BASED ON 1.73 SQ M-ARVRAT: 106 ML/MIN/1.73M2 (ref 60–?)
GLOBULIN PLAS-MCNC: 3.2 G/DL (ref 2.8–4.4)
GLUCOSE BLD-MCNC: 92 MG/DL (ref 70–99)
GLUCOSE UR STRIP.AUTO-MCNC: NEGATIVE MG/DL
HCT VFR BLD AUTO: 43.7 %
HDLC SERPL-MCNC: 61 MG/DL (ref 40–59)
HGB BLD-MCNC: 14.3 G/DL
IMM GRANULOCYTES # BLD AUTO: 0.01 X10(3) UL (ref 0–1)
IMM GRANULOCYTES NFR BLD: 0.1 %
KETONES UR STRIP.AUTO-MCNC: NEGATIVE MG/DL
LDLC SERPL CALC-MCNC: 112 MG/DL (ref ?–100)
LEUKOCYTE ESTERASE UR QL STRIP.AUTO: NEGATIVE
LYMPHOCYTES # BLD AUTO: 2.89 X10(3) UL (ref 1–4)
LYMPHOCYTES NFR BLD AUTO: 42 %
MCH RBC QN AUTO: 31.5 PG (ref 26–34)
MCHC RBC AUTO-ENTMCNC: 32.7 G/DL (ref 31–37)
MCV RBC AUTO: 96.3 FL
MONOCYTES # BLD AUTO: 0.58 X10(3) UL (ref 0.1–1)
MONOCYTES NFR BLD AUTO: 8.4 %
NEUTROPHILS # BLD AUTO: 3.23 X10 (3) UL (ref 1.5–7.7)
NEUTROPHILS # BLD AUTO: 3.23 X10(3) UL (ref 1.5–7.7)
NEUTROPHILS NFR BLD AUTO: 47 %
NITRITE UR QL STRIP.AUTO: NEGATIVE
NONHDLC SERPL-MCNC: 124 MG/DL (ref ?–130)
OSMOLALITY SERPL CALC.SUM OF ELEC: 285 MOSM/KG (ref 275–295)
PH UR STRIP.AUTO: 7 [PH] (ref 5–8)
PLATELET # BLD AUTO: 295 10(3)UL (ref 150–450)
POTASSIUM SERPL-SCNC: 4.5 MMOL/L (ref 3.5–5.1)
PROT SERPL-MCNC: 7.4 G/DL (ref 6.4–8.2)
PROT UR STRIP.AUTO-MCNC: NEGATIVE MG/DL
RBC # BLD AUTO: 4.54 X10(6)UL
RBC UR QL AUTO: NEGATIVE
SODIUM SERPL-SCNC: 138 MMOL/L (ref 136–145)
SP GR UR STRIP.AUTO: 1.01 (ref 1–1.03)
TRIGL SERPL-MCNC: 65 MG/DL (ref 30–149)
TSI SER-ACNC: 1.52 MIU/ML (ref 0.36–3.74)
UROBILINOGEN UR STRIP.AUTO-MCNC: 0.2 MG/DL
VLDLC SERPL CALC-MCNC: 11 MG/DL (ref 0–30)
WBC # BLD AUTO: 6.9 X10(3) UL (ref 4–11)

## 2022-11-22 PROCEDURE — 80050 GENERAL HEALTH PANEL: CPT | Performed by: FAMILY MEDICINE

## 2022-11-22 PROCEDURE — 81003 URINALYSIS AUTO W/O SCOPE: CPT | Performed by: FAMILY MEDICINE

## 2022-11-22 PROCEDURE — 80061 LIPID PANEL: CPT | Performed by: FAMILY MEDICINE

## 2023-01-03 ENCOUNTER — OFFICE VISIT (OUTPATIENT)
Dept: FAMILY MEDICINE CLINIC | Facility: CLINIC | Age: 43
End: 2023-01-03
Payer: COMMERCIAL

## 2023-01-03 VITALS
BODY MASS INDEX: 24.62 KG/M2 | RESPIRATION RATE: 20 BRPM | WEIGHT: 144.19 LBS | SYSTOLIC BLOOD PRESSURE: 128 MMHG | HEIGHT: 64 IN | OXYGEN SATURATION: 99 % | TEMPERATURE: 99 F | DIASTOLIC BLOOD PRESSURE: 78 MMHG | HEART RATE: 100 BPM

## 2023-01-03 DIAGNOSIS — J03.90 EXUDATIVE TONSILLITIS: Primary | ICD-10-CM

## 2023-01-03 DIAGNOSIS — R59.1 LYMPHADENOPATHY: ICD-10-CM

## 2023-01-03 DIAGNOSIS — M54.2 NECK PAIN: ICD-10-CM

## 2023-01-03 PROCEDURE — 3074F SYST BP LT 130 MM HG: CPT | Performed by: FAMILY MEDICINE

## 2023-01-03 PROCEDURE — 99214 OFFICE O/P EST MOD 30 MIN: CPT | Performed by: FAMILY MEDICINE

## 2023-01-03 PROCEDURE — 3078F DIAST BP <80 MM HG: CPT | Performed by: FAMILY MEDICINE

## 2023-01-03 PROCEDURE — 3008F BODY MASS INDEX DOCD: CPT | Performed by: FAMILY MEDICINE

## 2023-01-03 RX ORDER — OMEGA-3 FATTY ACIDS/FISH OIL 300-1000MG
CAPSULE ORAL EVERY 8 HOURS PRN
COMMUNITY

## 2023-01-03 RX ORDER — ACETAMINOPHEN 500 MG
1000 TABLET ORAL EVERY 6 HOURS PRN
COMMUNITY

## 2023-01-03 RX ORDER — AMOXICILLIN AND CLAVULANATE POTASSIUM 875; 125 MG/1; MG/1
1 TABLET, FILM COATED ORAL 2 TIMES DAILY
Qty: 20 TABLET | Refills: 0 | Status: SHIPPED | OUTPATIENT
Start: 2023-01-03 | End: 2023-01-13

## 2023-01-03 NOTE — PATIENT INSTRUCTIONS
Rest, fluids, hydrate,   Use mucinex DM for nasal congestin and cough. Vitamin c, zinc, and garlic to help your immune system. Tylenol and ibuprofen as appropriate. Call if not improving in 3-5 days to be re-seen. Saline nasal spray or nasal rinses will help with sinus issues. Call if new or worsening symptoms.     Tylenol and motrin  Augmentin   Off work for at least 24 hours     Monitor neck with streching, massage

## (undated) NOTE — MR AVS SNAPSHOT
[facility] [address] [place] [address],  [address]  [phone]               Thank you for choosing us for your health care visit with Shaheen Peters MD.  We are glad to serve you and happy to provide you with this summary of yo If you've recently had a stay at the Hospital you can access your discharge instructions in [facility] by going to Visits < Admission Summaries.  If you've been to the Emergency Department or your doctor's office, you can view your past visit information in My

## (undated) NOTE — LETTER
11/03/20        Suresh Almazan [facility]  [address]      Dear Rigoberto Pisano,    [facility] records indicate that you have outstanding lab work and or testing that was ordered for you and has not yet been completed:  Orders Placed This Encounter

## (undated) NOTE — MR AVS SNAPSHOT
[facility] [address]  [address] [id number]  [phone]               Thank you for choosing us for your health care visit with Charlotte Leary MD.  We are glad to serve you and happy to provide you with this summary your Zip Code and Date of Birth to complete the sign-up process. If you do not sign up before the expiration date, you must request a new code.     Your unique [facility] Access Code: [email or web address]  Expires: 4/28/2017  8:13 AM    If you have questions, you can c